# Patient Record
Sex: MALE | Race: OTHER | NOT HISPANIC OR LATINO | URBAN - METROPOLITAN AREA
[De-identification: names, ages, dates, MRNs, and addresses within clinical notes are randomized per-mention and may not be internally consistent; named-entity substitution may affect disease eponyms.]

---

## 2018-06-12 ENCOUNTER — EMERGENCY (EMERGENCY)
Facility: HOSPITAL | Age: 23
LOS: 1 days | Discharge: HOME | End: 2018-06-12
Admitting: EMERGENCY MEDICINE
Payer: MEDICAID

## 2018-06-12 VITALS
DIASTOLIC BLOOD PRESSURE: 77 MMHG | RESPIRATION RATE: 18 BRPM | HEART RATE: 75 BPM | SYSTOLIC BLOOD PRESSURE: 123 MMHG | TEMPERATURE: 98 F | OXYGEN SATURATION: 100 %

## 2018-06-12 DIAGNOSIS — M54.5 LOW BACK PAIN: ICD-10-CM

## 2018-06-12 DIAGNOSIS — R51 HEADACHE: ICD-10-CM

## 2018-06-12 PROCEDURE — 99284 EMERGENCY DEPT VISIT MOD MDM: CPT

## 2018-06-12 RX ORDER — IBUPROFEN 200 MG
1 TABLET ORAL
Qty: 20 | Refills: 0 | OUTPATIENT
Start: 2018-06-12 | End: 2018-06-16

## 2018-06-12 RX ORDER — IBUPROFEN 200 MG
600 TABLET ORAL ONCE
Qty: 0 | Refills: 0 | Status: COMPLETED | OUTPATIENT
Start: 2018-06-12 | End: 2018-06-12

## 2018-06-12 RX ORDER — LIDOCAINE 4 G/100G
1 CREAM TOPICAL ONCE
Qty: 0 | Refills: 0 | Status: COMPLETED | OUTPATIENT
Start: 2018-06-12 | End: 2018-06-12

## 2018-06-12 RX ORDER — METHOCARBAMOL 500 MG/1
1 TABLET, FILM COATED ORAL
Qty: 10 | Refills: 0 | OUTPATIENT
Start: 2018-06-12 | End: 2018-06-16

## 2018-06-12 RX ORDER — METHOCARBAMOL 500 MG/1
500 TABLET, FILM COATED ORAL ONCE
Qty: 0 | Refills: 0 | Status: COMPLETED | OUTPATIENT
Start: 2018-06-12 | End: 2018-06-12

## 2018-06-12 RX ADMIN — Medication 600 MILLIGRAM(S): at 20:02

## 2018-06-12 RX ADMIN — METHOCARBAMOL 500 MILLIGRAM(S): 500 TABLET, FILM COATED ORAL at 20:02

## 2018-06-12 RX ADMIN — LIDOCAINE 1 PATCH: 4 CREAM TOPICAL at 20:02

## 2018-06-12 NOTE — ED PROVIDER NOTE - MUSCULOSKELETAL, MLM
SLR of the bilateral legs >45 degrees, 5/5 strength to all 4 extremities. SLR of the bilateral legs >45 degrees, 5/5 strength to all 4 extremities.  Pt ambulatory, full weight bearing.

## 2018-06-12 NOTE — ED PROVIDER NOTE - OBJECTIVE STATEMENT
22 y/o male with no PMHx presents to ED c/o lower right-sided back pain. 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 Reports that he now has a headache and pain is aggravated when standing up. Denies any abd pain, nausea, vomiting, numbness or tingling to the legs, or any urinary or bowel symptoms. Had similar pain 2 years ago and had imaging done at that time. Patient has not taken any pain meds PTA and denies allergies to any meds or recent trauma or heavy lifting.. 24 y/o male with no PMHx presents to ED c/o lower right-sided back pain. 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 Reports that he now has a headache and pain is aggravated when standing up. Denies any abd pain, nausea, vomiting, numbness or tingling to the legs, or any urinary or bowel symptoms, weakness, illicit drug use, trauma/fall, fevers/chills. Had similar pain 2 years ago and had imaging done at that time. He was told the pain was related to a nerve.  Patient has not taken any pain meds PTA and denies allergies to any meds or recent trauma or heavy lifting..

## 2018-06-12 NOTE — ED PROVIDER NOTE - MEDICAL DECISION MAKING DETAILS
24 y/o M presents to ED c/o acute on chronic/recurrent R lumbar back pain after bending over.  No trauma.  5/5 strength and ambulatory.  Pt referred to [facility] and PCP, Rx for Robaxin and ibuprofen with return precautions.

## 2018-06-12 NOTE — ED ADULT NURSE NOTE - MUSCULOSKELETAL WDL
Full range of motion of upper and lower extremities, no joint tenderness/swelling. Reports back pain after heavy lifting.

## 2019-06-05 NOTE — ED ADULT NURSE NOTE - CAS TRG GENERAL NORM CIRC DET
Strong peripheral pulses
CBC Full  -  ( 05 Jun 2019 07:39 )  WBC Count : 8.1 K/uL  RBC Count : 4.92 M/uL  Hemoglobin : 15.5 g/dL  Hematocrit : 45.1 %  Platelet Count - Automated : 268 K/uL  Mean Cell Volume : 91.7 fl  Mean Cell Hemoglobin : 31.5 pg  Mean Cell Hemoglobin Concentration : 34.4 g/dL  Auto Neutrophil # : 4.7 K/uL  Auto Lymphocyte # : 2.4 K/uL  Auto Monocyte # : 0.5 K/uL  Auto Eosinophil # : 0.3 K/uL  Auto Basophil # : 0.1 K/uL  Auto Neutrophil % : 58.4 %  Auto Lymphocyte % : 30.1 %  Auto Monocyte % : 6.4 %  Auto Eosinophil % : 3.7 %  Auto Basophil % : 0.7 %    06-05    137  |  98  |  14.0  ----------------------------<  140<H>  4.3   |  25.0  |  0.94    Ca    9.7      05 Jun 2019 07:39    TPro  7.8  /  Alb  4.5  /  TBili  0.5  /  DBili  x   /  AST  46<H>  /  ALT  60<H>  /  AlkPhos  76  06-05  PT/INR - ( 05 Jun 2019 07:39 )   PT: 10.4 sec;   INR: 0.91 ratio         PTT - ( 05 Jun 2019 07:39 )  PTT:26.0 sec    < from: CT Chest w/ IV Cont (06.05.19 @ 08:09) >      IMPRESSION:     No intrathoracic or abdominal sequelae of trauma.    < end of copied text >    < from: CT Abdomen and Pelvis w/ IV Cont (06.05.19 @ 08:08) >    IMPRESSION:     No intrathoracic or abdominal sequelae of trauma.    < end of copied text >    < from: CT Head No Cont (06.05.19 @ 08:08) >    IMPRESSION:  No acute intracranial hemorrhage    No acute fracture cervical spine. If pain persists, follow-up MRI exam   recommended.    < end of copied text >

## 2021-09-18 ENCOUNTER — EMERGENCY (EMERGENCY)
Facility: HOSPITAL | Age: 26
LOS: 1 days | Discharge: HOME | End: 2021-09-18
Attending: EMERGENCY MEDICINE | Admitting: EMERGENCY MEDICINE
Payer: SELF-PAY

## 2021-09-18 VITALS
DIASTOLIC BLOOD PRESSURE: 92 MMHG | TEMPERATURE: 100 F | SYSTOLIC BLOOD PRESSURE: 161 MMHG | OXYGEN SATURATION: 97 % | WEIGHT: 176.37 LBS | HEIGHT: 68 IN | HEART RATE: 90 BPM | RESPIRATION RATE: 16 BRPM

## 2021-09-18 DIAGNOSIS — R10.13 EPIGASTRIC PAIN: ICD-10-CM

## 2021-09-18 LAB
ALBUMIN SERPL ELPH-MCNC: 4.2 G/DL — SIGNIFICANT CHANGE UP (ref 3.4–5)
ALP SERPL-CCNC: 116 U/L — SIGNIFICANT CHANGE UP (ref 40–120)
ALT FLD-CCNC: 102 U/L — HIGH (ref 12–42)
ANION GAP SERPL CALC-SCNC: 8 MMOL/L — LOW (ref 9–16)
AST SERPL-CCNC: 53 U/L — HIGH (ref 15–37)
BASOPHILS # BLD AUTO: 0.06 K/UL — SIGNIFICANT CHANGE UP (ref 0–0.2)
BASOPHILS NFR BLD AUTO: 0.6 % — SIGNIFICANT CHANGE UP (ref 0–2)
BILIRUB SERPL-MCNC: 0.4 MG/DL — SIGNIFICANT CHANGE UP (ref 0.2–1.2)
BUN SERPL-MCNC: 13 MG/DL — SIGNIFICANT CHANGE UP (ref 7–23)
CALCIUM SERPL-MCNC: 10 MG/DL — SIGNIFICANT CHANGE UP (ref 8.5–10.5)
CHLORIDE SERPL-SCNC: 104 MMOL/L — SIGNIFICANT CHANGE UP (ref 96–108)
CO2 SERPL-SCNC: 31 MMOL/L — SIGNIFICANT CHANGE UP (ref 22–31)
CREAT SERPL-MCNC: 1.09 MG/DL — SIGNIFICANT CHANGE UP (ref 0.5–1.3)
EOSINOPHIL # BLD AUTO: 0.28 K/UL — SIGNIFICANT CHANGE UP (ref 0–0.5)
EOSINOPHIL NFR BLD AUTO: 2.8 % — SIGNIFICANT CHANGE UP (ref 0–6)
GLUCOSE SERPL-MCNC: 118 MG/DL — HIGH (ref 70–99)
HCT VFR BLD CALC: 47.1 % — SIGNIFICANT CHANGE UP (ref 39–50)
HGB BLD-MCNC: 15.9 G/DL — SIGNIFICANT CHANGE UP (ref 13–17)
IMM GRANULOCYTES NFR BLD AUTO: 0.7 % — SIGNIFICANT CHANGE UP (ref 0–1.5)
LYMPHOCYTES # BLD AUTO: 2.9 K/UL — SIGNIFICANT CHANGE UP (ref 1–3.3)
LYMPHOCYTES # BLD AUTO: 28.7 % — SIGNIFICANT CHANGE UP (ref 13–44)
MCHC RBC-ENTMCNC: 30.3 PG — SIGNIFICANT CHANGE UP (ref 27–34)
MCHC RBC-ENTMCNC: 33.8 GM/DL — SIGNIFICANT CHANGE UP (ref 32–36)
MCV RBC AUTO: 89.7 FL — SIGNIFICANT CHANGE UP (ref 80–100)
MONOCYTES # BLD AUTO: 0.84 K/UL — SIGNIFICANT CHANGE UP (ref 0–0.9)
MONOCYTES NFR BLD AUTO: 8.3 % — SIGNIFICANT CHANGE UP (ref 2–14)
NEUTROPHILS # BLD AUTO: 5.94 K/UL — SIGNIFICANT CHANGE UP (ref 1.8–7.4)
NEUTROPHILS NFR BLD AUTO: 58.9 % — SIGNIFICANT CHANGE UP (ref 43–77)
NRBC # BLD: 0 /100 WBCS — SIGNIFICANT CHANGE UP (ref 0–0)
PLATELET # BLD AUTO: 240 K/UL — SIGNIFICANT CHANGE UP (ref 150–400)
POTASSIUM SERPL-MCNC: 3.8 MMOL/L — SIGNIFICANT CHANGE UP (ref 3.5–5.3)
POTASSIUM SERPL-SCNC: 3.8 MMOL/L — SIGNIFICANT CHANGE UP (ref 3.5–5.3)
PROT SERPL-MCNC: 8.5 G/DL — HIGH (ref 6.4–8.2)
RBC # BLD: 5.25 M/UL — SIGNIFICANT CHANGE UP (ref 4.2–5.8)
RBC # FLD: 12.8 % — SIGNIFICANT CHANGE UP (ref 10.3–14.5)
SODIUM SERPL-SCNC: 143 MMOL/L — SIGNIFICANT CHANGE UP (ref 132–145)
WBC # BLD: 10.09 K/UL — SIGNIFICANT CHANGE UP (ref 3.8–10.5)
WBC # FLD AUTO: 10.09 K/UL — SIGNIFICANT CHANGE UP (ref 3.8–10.5)

## 2021-09-18 PROCEDURE — 99284 EMERGENCY DEPT VISIT MOD MDM: CPT

## 2021-09-18 RX ORDER — FAMOTIDINE 10 MG/ML
20 INJECTION INTRAVENOUS ONCE
Refills: 0 | Status: COMPLETED | OUTPATIENT
Start: 2021-09-18 | End: 2021-09-18

## 2021-09-18 RX ORDER — SODIUM CHLORIDE 9 MG/ML
1000 INJECTION INTRAMUSCULAR; INTRAVENOUS; SUBCUTANEOUS ONCE
Refills: 0 | Status: COMPLETED | OUTPATIENT
Start: 2021-09-18 | End: 2021-09-18

## 2021-09-18 RX ADMIN — FAMOTIDINE 20 MILLIGRAM(S): 10 INJECTION INTRAVENOUS at 18:30

## 2021-09-18 RX ADMIN — SODIUM CHLORIDE 1000 MILLILITER(S): 9 INJECTION INTRAMUSCULAR; INTRAVENOUS; SUBCUTANEOUS at 18:30

## 2021-09-18 RX ADMIN — Medication 30 MILLILITER(S): at 18:30

## 2021-09-18 NOTE — ED PROVIDER NOTE - PATIENT PORTAL LINK FT
You can access the FollowMyHealth Patient Portal offered by [facility] by registering at the following website: [email or web address]. By joining [employer]’s FollowMyHealth portal, you will also be able to view your health information using other applications (apps) compatible with our system.

## 2021-09-18 NOTE — ED PROVIDER NOTE - OBJECTIVE STATEMENT
patient walk in with EMS with c/o abd pain x2 hours after eating fish today; denies n/v/d/fever patient walk in with EMS with c/o abd pain x2 hours after eating fish today; denies n/v/d/fever    25 yo male complaint of epigastric pain non progressive after eating fish 2 hours ago  no fever no n/v/d   no prior history of gi illnesses or surgeries

## 2021-09-18 NOTE — ED PROVIDER NOTE - NS ED ATTENDING STATEMENT MOD
Partially impaired: cannot see medication labels or newsprint, but can see obstacles in path, and the surrounding layout; can count fingers at arm's length
Attending Only

## 2021-09-18 NOTE — ED PROVIDER NOTE - NSFOLLOWUPINSTRUCTIONS_ED_ALL_ED_FT
soft bland diet for next 24 hours     stay well hydrated     follow up with your doctor as needed    return to ER for worsening abd pain    Abdominal Pain    Many things can cause abdominal pain. Many times, abdominal pain is not caused by a disease and will improve without treatment. Your health care provider will do a physical exam to determine if there is a dangerous cause of your pain; blood tests and imaging may help determine the cause of your pain. However, in many cases, no cause may be found and you may need further testing as an outpatient. Monitor your abdominal pain for any changes.     SEEK IMMEDIATE MEDICAL CARE IF YOU HAVE ANY OF THE FOLLOWING SYMPTOMS: worsening abdominal pain, uncontrollable vomiting, profuse diarrhea, inability to have bowel movements or pass gas, black or bloody stools, fever accompanying chest pain or back pain, or fainting. These symptoms may represent a serious problem that is an emergency. Do not wait to see if the symptoms will go away. Get medical help right away. Call 911 and do not drive yourself to the hospital.

## 2021-09-18 NOTE — ED PROVIDER NOTE - CPE EDP GASTRO NORM
Patient:   MARLENE VILLAREAL            MRN: [id number]            FIN: [id number]              Age:   14 years     Sex:  MALE     :  06   Associated Diagnoses:   Breakthrough seizure   Author:   KEVIN ALCOCER     Basic Information   Time seen: Date & time 20 06:24:00.   History source: Mother.   Arrival mode: Ambulance.   History limitation: Clinical condition, pt's age.   Additional information: Chief Complaint from Nursing Triage Note : Chief Complaint ED  20 06:00 CST       Chief Complaint ED        mom found pt having a seizure. mom gave rectal valium  .     History of Present Illness   14 year-old male with PMHx of cerebral palsy who was brought in by EMS from home s/p witnessed seizure, just prior to arrival. Mother states the patient awakened from sleep this morning still in bed with blank stare then shortly after had tonic clonic activity, lasting approximately 4 minutes. Mother gave patient rectal valium prior to EMS arrival. She adds that the patient has been drowsy since given the medication but not back to baseline.  His first occurrence of seizures was on 20, admitted for 3 day stay at [facility]. Patient was started on 300 mg Trileptal BID 8 days ago. No recent fall or head trauma. Patient is scheduled for f/u appointment with neurology next week. Further history is limited due to clinical condition.  .       Review of Systems   Constitutional symptoms:  No fever,    Gastrointestinal symptoms:  No vomiting, no diarrhea.    Neurologic symptoms:  Seizure.   Hematologic/Lymphatic symptoms:  Negative except as documented in HPI.             Additional review of systems information: Unable to obtain due to: Clinical condition, pt's age.     Health Status   Allergies:    Allergic Reactions (All)  Severity Not Documented  Eggs- No reactions were documented.  No Known Medication Allergies.   Medications: Per nurse's notes.   Immunizations: Per nurse's notes.     Past  Medical/ Family/ Social History   Medical history:    All Problems  Cerebral palsy / [id number] / Confirmed  CVA (cerebral vascular accident) / [id number] / Confirmed.  Surgical history:    No active procedure history items have been selected or recorded..  Family history: Include Family History   History is unknown..   Social history:    Social & Psychosocial Habits  Alcohol  02/27/2020  Alcohol Abuse in Household No    Use: None, None  Substance Abuse  02/27/2020  Substance Abuse in Household: No    Use: None, None  Tobacco  02/27/2020  Smoking Tobacco Use: Never smoker    Smoker in Household: No    Smoking Tobacco Use: Never smoker    Smoked/Smokeless Tobacco in Last 30 Days: No    Smoking Tobacco Use: Never smoker    Smokeless tobacco use: Never  .     Physical Examination              Vital Signs   Vital Sign   02/27/20 05:55 CST Heart/Pulse Rate 94  Normal    Pulse Source Monitor    Respiration Rate 21 breaths/min  HI    SpO2 100 %  Normal    NIBP Systolic 139  Normal    NIBP Diastolic 71  Normal    NIBP Source Left Arm    NIBP MAP 91  Normal   .   Height and Weight   02/27/20 06:50 CST CLINICALWEIGHT 83.5 kg    Weight Method Measured    MEDDOSEWT 83.5 kg   .              Oxygen saturation:  100 %.   O2 saturation reviewed and normal on room air.   General:  Alert, no acute distress.    Skin:  Warm, dry, normal for ethnicity.    Head:  Atraumatic.   Neck:  Supple.   Eye:  Pupils are equal, round and reactive to light, normal conjunctiva.   Ears, nose, mouth and throat:  Oral mucosa moist.   Cardiovascular:  Regular rate and rhythm, No murmur, Normal peripheral perfusion.   Respiratory:  Lungs are clear to auscultation, respirations are non-labored, breath sounds are equal.   Gastrointestinal:  Soft, Nontender, Non distended, Normal bowel sounds.   Musculoskeletal:  No tenderness, no swelling, no limb deformities.   Neurological:  No focal neurological deficit  observed, normal motor observed, post ictal on arrival .   Psychiatric:  Appropriate mood & affect.     Medical Decision Making   Differential Diagnosis:  breakthru seizure.   Rationale:  Multiple differential diagnoses were considered. The patient's family was apprised of diagnostic and treatment options including alternate modes of care, in addition to risks and benefits, for this medical condition. Based on this discussion the patient's family agrees with this chosen diagnostic and treatment plan..  Documents reviewed:  Emergency department nurses' notes.     Reexamination/ Reevaluation   Time: 02/27/20 09:15:00 .   Vital signs   results included from flowsheet : Vital Sign   02/27/20 09:00 CST Heart/Pulse Rate 91  Normal    Respiration Rate 22 breaths/min  HI    NIBP Systolic 118  Normal    NIBP Diastolic 56  LOW     Course: well controlled.   Assessment: exam improved, awake, alert and fully oriented; answering questions appropriately.  Notes:  .   Time: 02/27/20 09:46:00 .   Course: well controlled.   Assessment: exam improved.   Notes: Discussed case in detail with Dr. Jaramillo, neurologist at [place]. States patient is adult age pediatric patient and started off with small dose, recommends titrating up to 450 mg BID. Family should continue to f/u with him in clinic for upcoming appointment on 3/11. .  Time: 02/27/20 09:53:00 .   Course: well controlled.   Assessment: exam unchanged.   Notes: Reviewed diagnostic results with patient's family, as well as plans for disposition. Patient's family agrees to discharge home and understands need for the patient to follow-up on an outpatient basis..     Impression and Plan   Diagnosis   Breakthrough seizure ([id number] G40.919, Discharge, Medical)   Plan   Condition: Improved, Stable.    Disposition: Discharged: to home.    Patient was given the following educational materials: Seizure, Pediatric, Epilepsy.   Follow up with: Follow up with primary care provider Within 1  to 2 weeks Follow-up with Neurology specialist for re-evaluation as planned on March, 11th.   Per Dr. Jaramillo recommendation, please give 450 mg of Trileptal twice daily until further evaluation.   Return to emergency department for re-evaluation as needed..    Counseled: Family, Regarding diagnosis, Regarding diagnostic results, Regarding treatment plan, family understood.   Notes: Charting performed by ED Scribsasha Garcia for Dr. Godfrey.  I have reviewed the scribe's charting and agree that the final signed note accurately reflects my personal performance of the history, physical exam, hospital course, and assessment and plan.   normal...

## 2021-09-18 NOTE — ED PROVIDER NOTE - CLINICAL SUMMARY MEDICAL DECISION MAKING FREE TEXT BOX
The patient's symptoms progressively improved throughout the ED stay.  The patient tolerated PO fluids.  ED evaluation and management discussed with the patient and family (if available) in detail.  Close PMD and/or specialist follow up encouraged.  Strict ED return instructions discussed in detail for any worsening or new symptoms. The patient was given the opportunity to ask any questions about their discharge diagnosis and discharge instructions. The patient verbalized understanding of these instructions and need to return to the ED for any worsening of illness or for any concern. The patient received a printed version of the discharge instructions. The patient understands the Emergency Department diagnosis is a preliminary diagnosis often based on limited information and that the patient must adhere to the follow-up plan as discussed.  At the time of discharge from the Emergency Department, the patient is alert with fluent appropriate speech and ambulatory without difficulty.  A medical screening examination was performed and no emergency medical condition was identified.    tolerated po fluid   repeat abd exam soft and nontender  to deep palpation in all quadrants